# Patient Record
Sex: MALE | Race: WHITE | NOT HISPANIC OR LATINO | Employment: FULL TIME | URBAN - METROPOLITAN AREA
[De-identification: names, ages, dates, MRNs, and addresses within clinical notes are randomized per-mention and may not be internally consistent; named-entity substitution may affect disease eponyms.]

---

## 2022-03-05 ENCOUNTER — APPOINTMENT (OUTPATIENT)
Dept: GENERAL RADIOLOGY | Facility: HOSPITAL | Age: 42
End: 2022-03-05

## 2022-03-05 ENCOUNTER — HOSPITAL ENCOUNTER (EMERGENCY)
Facility: HOSPITAL | Age: 42
Discharge: HOME | End: 2022-03-05
Attending: EMERGENCY MEDICINE | Admitting: EMERGENCY MEDICINE

## 2022-03-05 VITALS
HEART RATE: 76 BPM | DIASTOLIC BLOOD PRESSURE: 84 MMHG | OXYGEN SATURATION: 99 % | RESPIRATION RATE: 16 BRPM | WEIGHT: 185 LBS | SYSTOLIC BLOOD PRESSURE: 123 MMHG | BODY MASS INDEX: 25.06 KG/M2 | HEIGHT: 72 IN | TEMPERATURE: 98 F

## 2022-03-05 DIAGNOSIS — S61.213A LACERATION OF LEFT MIDDLE FINGER WITHOUT FOREIGN BODY WITHOUT DAMAGE TO NAIL, INITIAL ENCOUNTER: Primary | ICD-10-CM

## 2022-03-05 PROCEDURE — 73140 X-RAY EXAM OF FINGER(S): CPT

## 2022-03-05 PROCEDURE — 99282 EMERGENCY DEPT VISIT SF MDM: CPT | Performed by: PHYSICIAN ASSISTANT

## 2022-03-05 PROCEDURE — 12001 RPR S/N/AX/GEN/TRNK 2.5CM/<: CPT | Performed by: PHYSICIAN ASSISTANT

## 2022-03-05 PROCEDURE — 99283 EMERGENCY DEPT VISIT LOW MDM: CPT

## 2022-03-05 PROCEDURE — 90471 IMMUNIZATION ADMIN: CPT | Performed by: PHYSICIAN ASSISTANT

## 2022-03-05 PROCEDURE — 99282 EMERGENCY DEPT VISIT SF MDM: CPT

## 2022-03-05 PROCEDURE — 90715 TDAP VACCINE 7 YRS/> IM: CPT | Performed by: PHYSICIAN ASSISTANT

## 2022-03-05 PROCEDURE — 25010000002 TETANUS-DIPHTH-ACELL PERTUSSIS 5-2.5-18.5 LF-MCG/0.5 SUSPENSION PREFILLED SYRINGE: Performed by: PHYSICIAN ASSISTANT

## 2022-03-05 RX ADMIN — TETANUS TOXOID, REDUCED DIPHTHERIA TOXOID AND ACELLULAR PERTUSSIS VACCINE, ADSORBED 0.5 ML: 5; 2.5; 8; 8; 2.5 SUSPENSION INTRAMUSCULAR at 11:37

## 2022-03-05 NOTE — EXTERNAL PATIENT INSTRUCTIONS
Patient Education   Table of Contents       Nonsutured Laceration Care     To view videos and all your education online visit,   [email or web address]   or scan this QR code with your smartphone.                  Nonsutured Laceration Care     A laceration is a cut that may go through all layers of the skin and extend into the tissue that is right under the skin. This type of cut is usually stitched up (sutured) or closed with tape (adhesive strips) or skin glue shortly after the injury happens.   However, if the wound is dirty or if several hours pass before medical treatment is provided, it is likely that germs (bacteria) will enter the wound. Closing a laceration after bacteria have entered it increases the risk of infection. In these cases, your health care provider may leave the laceration open (nonsutured) and cover it with a bandage. This type of treatment helps prevent infection and allows the wound to heal from the deepest layer of tissue damage up to the surface.   An open fracture is a type of injury that may involve nonsutured lacerations. An open fracture is a break in a bone that happens along with lacerations through the skin at the fracture site.   What are the risks?    Caring for a nonsutured laceration is safe. However, problems may occur, including a higher risk for:       Scarring.       Infection.       Slow healing.     Supplies needed:         Soap.       Hand .       Sterile water or irrigation solution.       Bandages (dressings).       Clean towel.       Antibiotic ointment.     How to care for your nonsutured laceration       Follow instructions from your health care provider about how to take care of your wound.       Keep the wound clean and dry.       Change any dressings as told by your health care provider. This includes changing the dressing when it starts to smell, or when it gets wet or dirty.       Clean the wound one time each day, or as often as told by your  health care provider. To clean your wound:     Wash your hands with soap and water. If soap and water are not available, use hand .       Remove any dressing as told by your health care provider.       Clean the wound with sterile water or irrigation solution as told by your health care provider.       Pat the wound dry with a clean towel. Do not rub the wound.       Apply a thin layer of antibiotic ointment to the wound as told by your health care provider. This will prevent infection and keep the dressing from sticking to the wound.       Apply a new dressing as told by your health care provider.        Check your wound every day for signs of infection. Watch for:       Redness, swelling, or pain.       Fluid, blood, or pus.       Bad smell on the wound or dressing.       Warmth.      Do not  take baths, swim, or do anything that puts your wound underwater until your health care provider approves.      Do not  scratch or pick at the wound.       Follow these instructions at home:         Take or apply over-the-counter and prescription medicines only as told by your health care provider.       If you were prescribed an antibiotic medicine, take or apply it as told by your health care provider. Do not  stop using the antibiotic even if your condition improves.      Do not  inject anything into the wound unless directed by your health care provider.       Raise (elevate) the injured area above the level of your heart while you are sitting or lying down, if possible.      If directed, put ice on the affected area:       Put ice in a plastic bag.       Place a towel between your skin and the bag.       Leave the ice on for 20 minutes, 2?3 times a day.       Keep all follow-up visits as told by your health care provider. This is important.     Contact a health care provider if:         You received a tetanus shot and you have swelling, severe pain, redness, or bleeding at the injection site.       You have a  fever.       Your pain is not controlled with medicine.       You have increased redness, swelling, or pain at the site of your wound.       You have fluid, blood, or pus coming from your wound.       You notice a bad smell coming from your wound or your dressing.       You notice something coming out of the wound, such as wood or glass.       You notice a change in the color of your skin near your wound.       You develop a new rash.       You need to change the dressing frequently due to fluid, blood, or pus draining from the wound.       You develop numbness around your wound.     Get help right away if:         Your pain suddenly increases and is severe.       You develop severe swelling around the wound.       The wound is on your hand or foot and you cannot properly move a finger or toe.       The wound is on your hand or foot, and you notice that your fingers or toes look pale or bluish.       You have a red streak going away from your wound.     Summary         A laceration is a cut that may go through all layers of the skin and extend into the tissue that is right under the skin. It is usually closed with stitches, tape, or skin glue shortly after the injury happens.       If a wound is dirty or if several hours pass before medical treatment is provided, the laceration may be kept open (nonsutured) and covered with a bandage.       This type of treatment helps prevent infection and allows the wound to heal from the deepest layer of tissue damage up to the surface.       Follow instructions from your health care provider about how to take care of your wound.     This information is not intended to replace advice given to you by your health care provider. Make sure you discuss any questions you have with your health care provider.     Document Released: 11/15/2007Document Revised: 04/10/2020Document Reviewed: 01/07/2019     [employer] Patient Education ? 2021 [employer]

## 2022-03-05 NOTE — EXTERNAL PATIENT INSTRUCTIONS
Patient Education   Table of Contents       Nonsutured Laceration Care     To view videos and all your education online visit,   [email or web address]   or scan this QR code with your smartphone.                  Nonsutured Laceration Care     A laceration is a cut that may go through all layers of the skin and extend into the tissue that is right under the skin. This type of cut is usually stitched up (sutured) or closed with tape (adhesive strips) or skin glue shortly after the injury happens.   However, if the wound is dirty or if several hours pass before medical treatment is provided, it is likely that germs (bacteria) will enter the wound. Closing a laceration after bacteria have entered it increases the risk of infection. In these cases, your health care provider may leave the laceration open (nonsutured) and cover it with a bandage. This type of treatment helps prevent infection and allows the wound to heal from the deepest layer of tissue damage up to the surface.   An open fracture is a type of injury that may involve nonsutured lacerations. An open fracture is a break in a bone that happens along with lacerations through the skin at the fracture site.   What are the risks?    Caring for a nonsutured laceration is safe. However, problems may occur, including a higher risk for:       Scarring.       Infection.       Slow healing.     Supplies needed:         Soap.       Hand .       Sterile water or irrigation solution.       Bandages (dressings).       Clean towel.       Antibiotic ointment.     How to care for your nonsutured laceration       Follow instructions from your health care provider about how to take care of your wound.       Keep the wound clean and dry.       Change any dressings as told by your health care provider. This includes changing the dressing when it starts to smell, or when it gets wet or dirty.       Clean the wound one time each day, or as often as told by your  health care provider. To clean your wound:     Wash your hands with soap and water. If soap and water are not available, use hand .       Remove any dressing as told by your health care provider.       Clean the wound with sterile water or irrigation solution as told by your health care provider.       Pat the wound dry with a clean towel. Do not rub the wound.       Apply a thin layer of antibiotic ointment to the wound as told by your health care provider. This will prevent infection and keep the dressing from sticking to the wound.       Apply a new dressing as told by your health care provider.        Check your wound every day for signs of infection. Watch for:       Redness, swelling, or pain.       Fluid, blood, or pus.       Bad smell on the wound or dressing.       Warmth.      Do not  take baths, swim, or do anything that puts your wound underwater until your health care provider approves.      Do not  scratch or pick at the wound.       Follow these instructions at home:         Take or apply over-the-counter and prescription medicines only as told by your health care provider.       If you were prescribed an antibiotic medicine, take or apply it as told by your health care provider. Do not  stop using the antibiotic even if your condition improves.      Do not  inject anything into the wound unless directed by your health care provider.       Raise (elevate) the injured area above the level of your heart while you are sitting or lying down, if possible.      If directed, put ice on the affected area:       Put ice in a plastic bag.       Place a towel between your skin and the bag.       Leave the ice on for 20 minutes, 2?3 times a day.       Keep all follow-up visits as told by your health care provider. This is important.     Contact a health care provider if:         You received a tetanus shot and you have swelling, severe pain, redness, or bleeding at the injection site.       You have a  fever.       Your pain is not controlled with medicine.       You have increased redness, swelling, or pain at the site of your wound.       You have fluid, blood, or pus coming from your wound.       You notice a bad smell coming from your wound or your dressing.       You notice something coming out of the wound, such as wood or glass.       You notice a change in the color of your skin near your wound.       You develop a new rash.       You need to change the dressing frequently due to fluid, blood, or pus draining from the wound.       You develop numbness around your wound.     Get help right away if:         Your pain suddenly increases and is severe.       You develop severe swelling around the wound.       The wound is on your hand or foot and you cannot properly move a finger or toe.       The wound is on your hand or foot, and you notice that your fingers or toes look pale or bluish.       You have a red streak going away from your wound.     Summary         A laceration is a cut that may go through all layers of the skin and extend into the tissue that is right under the skin. It is usually closed with stitches, tape, or skin glue shortly after the injury happens.       If a wound is dirty or if several hours pass before medical treatment is provided, the laceration may be kept open (nonsutured) and covered with a bandage.       This type of treatment helps prevent infection and allows the wound to heal from the deepest layer of tissue damage up to the surface.       Follow instructions from your health care provider about how to take care of your wound.     This information is not intended to replace advice given to you by your health care provider. Make sure you discuss any questions you have with your health care provider.     Document Released: 11/15/2007Document Revised: 04/10/2020Document Reviewed: 01/07/2019     [employer] Patient Education ? 2021 [employer]

## 2022-03-05 NOTE — EXTERNAL PATIENT INSTRUCTIONS
Patient Education   Table of Contents       Nonsutured Laceration Care     To view videos and all your education online visit,   [email or web address]   or scan this QR code with your smartphone.                  Nonsutured Laceration Care     A laceration is a cut that may go through all layers of the skin and extend into the tissue that is right under the skin. This type of cut is usually stitched up (sutured) or closed with tape (adhesive strips) or skin glue shortly after the injury happens.   However, if the wound is dirty or if several hours pass before medical treatment is provided, it is likely that germs (bacteria) will enter the wound. Closing a laceration after bacteria have entered it increases the risk of infection. In these cases, your health care provider may leave the laceration open (nonsutured) and cover it with a bandage. This type of treatment helps prevent infection and allows the wound to heal from the deepest layer of tissue damage up to the surface.   An open fracture is a type of injury that may involve nonsutured lacerations. An open fracture is a break in a bone that happens along with lacerations through the skin at the fracture site.   What are the risks?    Caring for a nonsutured laceration is safe. However, problems may occur, including a higher risk for:       Scarring.       Infection.       Slow healing.     Supplies needed:         Soap.       Hand .       Sterile water or irrigation solution.       Bandages (dressings).       Clean towel.       Antibiotic ointment.     How to care for your nonsutured laceration       Follow instructions from your health care provider about how to take care of your wound.       Keep the wound clean and dry.       Change any dressings as told by your health care provider. This includes changing the dressing when it starts to smell, or when it gets wet or dirty.       Clean the wound one time each day, or as often as told by your  health care provider. To clean your wound:     Wash your hands with soap and water. If soap and water are not available, use hand .       Remove any dressing as told by your health care provider.       Clean the wound with sterile water or irrigation solution as told by your health care provider.       Pat the wound dry with a clean towel. Do not rub the wound.       Apply a thin layer of antibiotic ointment to the wound as told by your health care provider. This will prevent infection and keep the dressing from sticking to the wound.       Apply a new dressing as told by your health care provider.        Check your wound every day for signs of infection. Watch for:       Redness, swelling, or pain.       Fluid, blood, or pus.       Bad smell on the wound or dressing.       Warmth.      Do not  take baths, swim, or do anything that puts your wound underwater until your health care provider approves.      Do not  scratch or pick at the wound.       Follow these instructions at home:         Take or apply over-the-counter and prescription medicines only as told by your health care provider.       If you were prescribed an antibiotic medicine, take or apply it as told by your health care provider. Do not  stop using the antibiotic even if your condition improves.      Do not  inject anything into the wound unless directed by your health care provider.       Raise (elevate) the injured area above the level of your heart while you are sitting or lying down, if possible.      If directed, put ice on the affected area:       Put ice in a plastic bag.       Place a towel between your skin and the bag.       Leave the ice on for 20 minutes, 2?3 times a day.       Keep all follow-up visits as told by your health care provider. This is important.     Contact a health care provider if:         You received a tetanus shot and you have swelling, severe pain, redness, or bleeding at the injection site.       You have a  fever.       Your pain is not controlled with medicine.       You have increased redness, swelling, or pain at the site of your wound.       You have fluid, blood, or pus coming from your wound.       You notice a bad smell coming from your wound or your dressing.       You notice something coming out of the wound, such as wood or glass.       You notice a change in the color of your skin near your wound.       You develop a new rash.       You need to change the dressing frequently due to fluid, blood, or pus draining from the wound.       You develop numbness around your wound.     Get help right away if:         Your pain suddenly increases and is severe.       You develop severe swelling around the wound.       The wound is on your hand or foot and you cannot properly move a finger or toe.       The wound is on your hand or foot, and you notice that your fingers or toes look pale or bluish.       You have a red streak going away from your wound.     Summary         A laceration is a cut that may go through all layers of the skin and extend into the tissue that is right under the skin. It is usually closed with stitches, tape, or skin glue shortly after the injury happens.       If a wound is dirty or if several hours pass before medical treatment is provided, the laceration may be kept open (nonsutured) and covered with a bandage.       This type of treatment helps prevent infection and allows the wound to heal from the deepest layer of tissue damage up to the surface.       Follow instructions from your health care provider about how to take care of your wound.     This information is not intended to replace advice given to you by your health care provider. Make sure you discuss any questions you have with your health care provider.     Document Released: 11/15/2007Document Revised: 04/10/2020Document Reviewed: 01/07/2019     [employer] Patient Education ? 2021 [employer]

## 2022-03-05 NOTE — ED PROVIDER NOTES
EMERGENCY DEPARTMENT ENCOUNTER      Room Number: 14/14    History is provided by the patient, no translation services needed    HPI:    Chief complaint: finger laceration    Narrative: Pt is a 41 y.o. male who presents complaining of laceration to hand.  Patient was working with a drill today when his glove got caught and he had a crush wound to the middle finger on his left hand causing a laceration.  He reports he feels some numbness in the finger and therefore he wanted to be evaluated as he was worried about a potential fracture or nerve damage.  Reports he thinks his tetanus was updated recently but is not certain.  No other injuries.  He is not any blood thinners.  No other complaints.      PMD: Provider, No Known    REVIEW OF SYSTEMS  Review of Systems  Complete review of systems performed otherwise negative except pertinent positives per HPI.    PAST MEDICAL HISTORY  Active Ambulatory Problems     Diagnosis Date Noted   • No Active Ambulatory Problems     Resolved Ambulatory Problems     Diagnosis Date Noted   • No Resolved Ambulatory Problems     Past Medical History:   Diagnosis Date   • Hyperlipidemia    • Hypertension        PAST SURGICAL HISTORY  No past surgical history on file.    FAMILY HISTORY  No family history on file.    SOCIAL HISTORY  Social History     Socioeconomic History   • Marital status: Other   Substance and Sexual Activity   • Alcohol use: Never   • Drug use: Never   • Sexual activity: Never       ALLERGIES  Patient has no known allergies.    No current facility-administered medications for this encounter.    Current Outpatient Medications:   •  NON FORMULARY, 1 tablet Daily. For htn, Disp: , Rfl:   •  NON FORMULARY, 1 tablet Daily. For choles, Disp: , Rfl:     PHYSICAL EXAM  ED Triage Vitals [03/05/22 1054]   Temp Heart Rate Resp BP SpO2   98 °F (36.7 °C) 76 16 123/84 99 %      Temp src Heart Rate Source Patient Position BP Location FiO2 (%)   -- -- Sitting Right arm --        Physical Exam  Vitals and nursing note reviewed.   Constitutional:       Appearance: Normal appearance. He is normal weight. He is not ill-appearing or toxic-appearing.   HENT:      Head: Normocephalic and atraumatic.      Nose: Nose normal.      Mouth/Throat:      Mouth: Mucous membranes are moist.   Eyes:      Extraocular Movements: Extraocular movements intact.      Conjunctiva/sclera: Conjunctivae normal.      Pupils: Pupils are equal, round, and reactive to light.   Cardiovascular:      Rate and Rhythm: Normal rate and regular rhythm.   Pulmonary:      Effort: Pulmonary effort is normal.   Abdominal:      General: Abdomen is flat.      Palpations: Abdomen is soft.   Musculoskeletal:         General: Normal range of motion.      Cervical back: Normal range of motion.   Skin:     General: Skin is warm.      Capillary Refill: Capillary refill takes less than 2 seconds. Normal capillary refill of injured digit.  Radial pulses strong.     Coloration: Skin is not pale.      Comments: Superficial laceration to the middle digit on the left hand on the pad of the proximal phalanx.  Cannot visualize tendon, blood vessels or any structures underneath.  Wound is not gaping.   Neurological:      General: No focal deficit present.      Mental Status: He is alert.   Psychiatric:         Mood and Affect: Mood normal.           LAB RESULTS  Lab Results (last 24 hours)     ** No results found for the last 24 hours. **            I ordered the above labs and reviewed the results    RADIOLOGY  No Radiology Exams Resulted Within Past 24 Hours    I ordered the above radiologic testing and reviewed the results    PROCEDURES  Laceration Repair    Date/Time: 3/12/2022 9:10 PM  Performed by: Leyda Pulido PA-C  Authorized by: Xu Hunter MD     Consent:     Consent obtained:  Verbal    Consent given by:  Patient    Risks discussed:  Infection and poor cosmetic result  Anesthesia:     Anesthesia method:   None  Laceration details:     Location:  Finger    Finger location:  L long finger    Length (cm):  1    Depth (mm):  2  Exploration:     Imaging obtained: x-ray      Imaging outcome: foreign body not noted      Wound exploration: wound explored through full range of motion      Contaminated: no    Treatment:     Area cleansed with:  Saline  Skin repair:     Repair method:  Tissue adhesive  Approximation:     Approximation:  Close  Post-procedure details:     Dressing:  Open (no dressing)    Procedure completion:  Tolerated well, no immediate complications          PROGRESS AND CONSULTS  ED Course as of 03/12/22 2111   Sat Mar 05, 2022   1123 XR finger: no acute fracture  [KM]      ED Course User Index  [KM] Leyda Pulido, CHARLEE           MEDICAL DECISION MAKING    MDM     41-year-old male seen and evaluated for laceration to the third digit on the left hand.  This is quite superficial.  X-ray obtained of the finger and does not show any underlying fractures.  He does feel some numbness in the finger although I think with how superficial the laceration is and it is very centrally located it is highly unlikely patient damaged a nerve and I think this is likely just from the impact causing him to have some reduced sensation at this time but will likely improve.  Range of motion is normal.  He does have normal capillary refill.  Wound was explored through full range of motion and saline was used to clean the wound and as it was not very deep patient wishes to have skin glue applied as opposed to sutures and I think this is reasonable.  His tetanus was updated today.  He was sent home in stable condition with appropriate return precautions.    DIAGNOSIS  Final diagnoses:   Laceration of left middle finger without foreign body without damage to nail, initial encounter       Latest Documented Vital Signs:  As of 21:11 EST  BP- 123/84 HR- 76 Temp- 98 °F (36.7 °C) O2 sat- 99%    DISPOSITION    Discussed pertinent  findings with the patient/family.  Patient/Family voiced understanding of need to follow-up for recheck and further testing as needed.  Return to the Emergency Department warnings were given.         Medication List      No changes were made to your prescriptions during this visit.              Follow-up Information     Call  Provider, No Known.    Why: To schedule follow up appointment  Contact information:  [facility] [id number] [phone]                           Dictated utilizing Dragon dictation     Leyda Pulido PA-C  03/05/22 1141       Leyda Pulido PA-C  03/12/22 9220

## 2024-11-08 ENCOUNTER — TRANSCRIBE ORDERS (OUTPATIENT)
Age: 44
End: 2024-11-08
Payer: COMMERCIAL

## 2024-11-08 DIAGNOSIS — R20.2 PARESTHESIA OF SKIN: Primary | ICD-10-CM

## 2024-11-15 ENCOUNTER — OFFICE VISIT (OUTPATIENT)
Dept: CARDIOLOGY | Facility: CLINIC | Age: 44
End: 2024-11-15
Payer: COMMERCIAL

## 2024-11-15 VITALS
HEIGHT: 60 IN | OXYGEN SATURATION: 96 % | WEIGHT: 191 LBS | RESPIRATION RATE: 16 BRPM | BODY MASS INDEX: 37.5 KG/M2 | DIASTOLIC BLOOD PRESSURE: 80 MMHG | SYSTOLIC BLOOD PRESSURE: 120 MMHG | HEART RATE: 75 BPM

## 2024-11-15 DIAGNOSIS — R94.31 ABNORMAL EKG: ICD-10-CM

## 2024-11-15 DIAGNOSIS — R07.2 PRECORDIAL PAIN: Primary | ICD-10-CM

## 2024-11-15 PROCEDURE — 99204 OFFICE O/P NEW MOD 45 MIN: CPT | Performed by: INTERNAL MEDICINE

## 2024-11-15 RX ORDER — METOPROLOL TARTRATE 50 MG
TABLET ORAL
Qty: 2 TABLET | Refills: 0 | Status: SHIPPED | OUTPATIENT
Start: 2024-11-15

## 2024-11-15 RX ORDER — METOPROLOL TARTRATE 25 MG/1
25 TABLET, FILM COATED ORAL ONCE
OUTPATIENT
Start: 2024-11-22

## 2024-11-15 RX ORDER — METOPROLOL TARTRATE 25 MG/1
150 TABLET, FILM COATED ORAL ONCE
OUTPATIENT
Start: 2024-11-22

## 2024-11-15 RX ORDER — METOPROLOL TARTRATE 25 MG/1
50 TABLET, FILM COATED ORAL ONCE
OUTPATIENT
Start: 2024-11-22

## 2024-11-15 RX ORDER — SODIUM CHLORIDE 0.9 % (FLUSH) 0.9 %
10 SYRINGE (ML) INJECTION EVERY 12 HOURS SCHEDULED
OUTPATIENT
Start: 2024-11-22

## 2024-11-15 RX ORDER — SODIUM CHLORIDE 0.9 % (FLUSH) 0.9 %
10 SYRINGE (ML) INJECTION AS NEEDED
OUTPATIENT
Start: 2024-11-22

## 2024-11-15 RX ORDER — IVABRADINE 5 MG/1
15 TABLET, FILM COATED ORAL ONCE
OUTPATIENT
Start: 2024-11-22 | End: 2024-11-15

## 2024-11-15 RX ORDER — METOPROLOL TARTRATE 25 MG/1
100 TABLET, FILM COATED ORAL ONCE
OUTPATIENT
Start: 2024-11-22

## 2024-11-15 RX ORDER — NITROGLYCERIN 0.4 MG/1
0.4 TABLET SUBLINGUAL
OUTPATIENT
Start: 2024-11-22 | End: 2024-11-15

## 2024-11-15 RX ORDER — NITROGLYCERIN 0.4 MG/1
0.8 TABLET SUBLINGUAL
OUTPATIENT
Start: 2024-11-22

## 2024-11-15 RX ORDER — METOPROLOL TARTRATE 1 MG/ML
5 INJECTION, SOLUTION INTRAVENOUS
OUTPATIENT
Start: 2024-11-22

## 2024-11-15 RX ORDER — SODIUM CHLORIDE 9 MG/ML
40 INJECTION, SOLUTION INTRAVENOUS AS NEEDED
OUTPATIENT
Start: 2024-11-22

## 2024-11-15 RX ORDER — METOPROLOL TARTRATE 25 MG/1
200 TABLET, FILM COATED ORAL ONCE
OUTPATIENT
Start: 2024-11-22 | End: 2024-11-15

## 2024-11-15 RX ORDER — METOPROLOL TARTRATE 50 MG
50 TABLET ORAL
OUTPATIENT
Start: 2024-11-22

## 2024-11-18 NOTE — PROGRESS NOTES
[facility]      Patient Name: Jeramy Wilson  :1980  Age: 44 y.o.  Encounter Provider:  Soto Irene Jr, MD      Chief Complaint: Initial evaluation of chest discomfort        HPI  Jeramy Wilson is a 44 y.o. male past medical history hypertension and mixed hyperlipidemia who presents for evaluation of chest discomfort.  Patient has had random episodes of atypical sounding chest discomfort with bilateral arm paresthesias.  Arm paresthesias persisted after chest pain resolved but a few days later the chest pain came back and persisted for few days without respite.  Once resolved the patient has not experienced symptoms for the last 2 weeks.  Patient states that the pain had variable characteristic with some pressure and some short sharp pain which was consistent with a similar episode about 6 to 7 years ago.  He saw [facility] at that time and had some testing but records are not available for review.  Patient states that he exercises daily for about 30 minutes and lifts weights 3 times per week.  He is a former smoker who quit 18 years ago, drinks socially and denies illicit drug use.  No family history of coronary artery disease.  EKG today shows sinus rhythm with T wave inversion in the inferolateral leads.      The following portions of the patient's history were reviewed and updated as appropriate: allergies, current medications, past family history, past medical history, past social history, past surgical history and problem list.      Review of Systems   Constitutional: Negative for chills and fever.   HENT:  Negative for hoarse voice and sore throat.    Eyes:  Negative for double vision and photophobia.   Cardiovascular:  Positive for chest pain. Negative for leg swelling, near-syncope, orthopnea, palpitations, paroxysmal nocturnal dyspnea and syncope.   Respiratory:  Negative for cough and wheezing.    Skin:  Negative for poor wound healing and rash.  "  Musculoskeletal:  Negative for arthritis and joint swelling.   Gastrointestinal:  Negative for bloating, abdominal pain, hematemesis and hematochezia.   Neurological:  Positive for paresthesias. Negative for dizziness and focal weakness.   Psychiatric/Behavioral:  Negative for depression and suicidal ideas.      OBJECTIVE:   Vital Signs  Vitals:    11/15/24 1300   BP: 120/80   Pulse: 75   Resp: 16   SpO2: 96%     Estimated body mass index is 167.13 kg/m² as calculated from the following:    Height as of this encounter: 72 cm (28.35\").    Weight as of this encounter: 86.6 kg (191 lb).    Vitals reviewed.   Constitutional:       Appearance: Healthy appearance. Not in distress.   Neck:      Vascular: No JVR. JVD normal.   Pulmonary:      Effort: Pulmonary effort is normal.      Breath sounds: Normal breath sounds. No wheezing. No rhonchi. No rales.   Chest:      Chest wall: Not tender to palpatation.   Cardiovascular:      PMI at left midclavicular line. Normal rate. Regular rhythm. Normal S1. Normal S2.       Murmurs: There is no murmur.      No gallop.  No click. No rub.   Pulses:     Intact distal pulses.   Edema:     Peripheral edema absent.   Abdominal:      General: Bowel sounds are normal.      Palpations: Abdomen is soft.      Tenderness: There is no abdominal tenderness.   Musculoskeletal: Normal range of motion.         General: No tenderness. Skin:     General: Skin is warm and dry.   Neurological:      General: No focal deficit present.      Mental Status: Alert and oriented to person, place and time.       ECG 12 Lead    Date/Time: 11/19/2024 8:35 AM  Performed by: Soto Irene Jr., MD    Authorized by: ProviderYennifer MD  Comparison: not compared with previous ECG   Previous ECG: no previous ECG available  Rhythm: sinus rhythm  T inversion: II, III, aVF, V5 and V6    Clinical impression: myocardial ischemia           BUN   Date Value Ref Range Status   02/23/2020 18 7 - 20 mg/dL Final "     Creatinine   Date Value Ref Range Status   02/23/2020 0.8 0.7 - 1.5 mg/dL Final     Potassium   Date Value Ref Range Status   02/23/2020 4.3 3.5 - 5.1 mmol/L Final           ASSESSMENT:      Diagnosis Plan   1. Precordial pain  CT Angiogram Coronary    CT Angiogram Coronary-Cardiology Interpretation    metoprolol tartrate (LOPRESSOR) tablet 200 mg    metoprolol tartrate (LOPRESSOR) tablet 150 mg    metoprolol tartrate (LOPRESSOR) tablet 100 mg    metoprolol tartrate (LOPRESSOR) tablet 50 mg    metoprolol tartrate (LOPRESSOR) tablet 25 mg    metoprolol tartrate (LOPRESSOR) tablet 50 mg    metoprolol tartrate (LOPRESSOR) injection 5 mg    nitroglycerin (NITROSTAT) SL tablet 0.4 mg    nitroglycerin (NITROSTAT) SL tablet 0.8 mg    ivabradine HCl (CORLANOR) tablet 15 mg    No Caffeine or Nicotine 4 Hours Prior to CTA Appointment    Nothing to Eat or Drink 4 Hours Prior to CTA Appointment    Do Not Take Phosphodiasterase Inhibitors in the 72 Hours Prior to Coronary CTA    Obtain Informed Consent - Computed Tomography Angiography of Chest - Angiogram of Coronary Arteries    Vital Signs Upon Arrival    Cardiac Monitoring    Verify NPO Status - Patient to Be NPO at Least 4 Hours Prior to CTA    Notify CT After Administration of metoprolol tartrate (LOPRESSOR) tablet    Notify Provider If Total Metoprolol Given Equals 300mg & Heart Rate Not At Goal    Notify Provider Prior to Administration of Nitroglycerin if Patient SBP <80    POC Creatinine    Insert Peripheral IV    Saline Lock & Maintain IV Access    sodium chloride 0.9 % flush 10 mL    sodium chloride 0.9 % flush 10 mL    sodium chloride 0.9 % infusion 40 mL    Vital Signs - See Instructions    Hold Medication Metformin (Glucophage, Glucophage XR, Fortament, Glumetza);  Metglip (metformin/glipizide);  Glucovance (metformin/glyburide); Avandamet (metformin/rosiglitazone)    Patient May Discharge Home After Procedure Complete (If Stable)    metoprolol tartrate  (LOPRESSOR) 50 MG tablet    Adult Transthoracic Echo Complete w/ Color, Spectral and Contrast if Necessary Per Protocol      2. Abnormal EKG  CT Angiogram Coronary    CT Angiogram Coronary-Cardiology Interpretation    metoprolol tartrate (LOPRESSOR) tablet 200 mg    metoprolol tartrate (LOPRESSOR) tablet 150 mg    metoprolol tartrate (LOPRESSOR) tablet 100 mg    metoprolol tartrate (LOPRESSOR) tablet 50 mg    metoprolol tartrate (LOPRESSOR) tablet 25 mg    metoprolol tartrate (LOPRESSOR) tablet 50 mg    metoprolol tartrate (LOPRESSOR) injection 5 mg    nitroglycerin (NITROSTAT) SL tablet 0.4 mg    nitroglycerin (NITROSTAT) SL tablet 0.8 mg    ivabradine HCl (CORLANOR) tablet 15 mg    No Caffeine or Nicotine 4 Hours Prior to CTA Appointment    Nothing to Eat or Drink 4 Hours Prior to CTA Appointment    Do Not Take Phosphodiasterase Inhibitors in the 72 Hours Prior to Coronary CTA    Obtain Informed Consent - Computed Tomography Angiography of Chest - Angiogram of Coronary Arteries    Vital Signs Upon Arrival    Cardiac Monitoring    Verify NPO Status - Patient to Be NPO at Least 4 Hours Prior to CTA    Notify CT After Administration of metoprolol tartrate (LOPRESSOR) tablet    Notify Provider If Total Metoprolol Given Equals 300mg & Heart Rate Not At Goal    Notify Provider Prior to Administration of Nitroglycerin if Patient SBP <80    POC Creatinine    Insert Peripheral IV    Saline Lock & Maintain IV Access    sodium chloride 0.9 % flush 10 mL    sodium chloride 0.9 % flush 10 mL    sodium chloride 0.9 % infusion 40 mL    Vital Signs - See Instructions    Hold Medication Metformin (Glucophage, Glucophage XR, Fortament, Glumetza);  Metglip (metformin/glipizide);  Glucovance (metformin/glyburide); Avandamet (metformin/rosiglitazone)    Patient May Discharge Home After Procedure Complete (If Stable)    metoprolol tartrate (LOPRESSOR) 50 MG tablet    Adult Transthoracic Echo Complete w/ Color, Spectral and Contrast if  Necessary Per Protocol            PLAN OF CARE:     Precordial pain -typical and atypical characteristics in gentleman with abnormal EKG.  Stress study 6 years ago without evidence of ischemia and given the recurrent nature of the chest pain and abnormal EKG we will need to move forward with testing.  Plan for CT coronary angiogram.  Follow diagnostic testing results for further treatment recommendations.  Essential hypertension -seemingly well-controlled this time.  Twice daily blood pressure log to be sent in after 1 week.  Mixed hyperlipidemia    Return to clinic 6 months             Discharge Medications            Accurate as of November 15, 2024 11:59 PM. If you have any questions, ask your nurse or doctor.                New Medications        Instructions Start Date   metoprolol tartrate 50 MG tablet  Commonly known as: LOPRESSOR  Started by: Soto Irene   Take 50 mg at Bedtime the Night Before Coronary CTA Appointment and In the Morning 1 Hour Prior to Coronary CTA Appointment. Do not take if heart rate less than 60.             Continue These Medications        Instructions Start Date   lansoprazole 30 MG capsule  Commonly known as: PREVACID   lansoprazole 30 mg capsule,delayed release      losartan 50 MG tablet  Commonly known as: COZAAR   1 tablet, Oral, Daily      pravastatin 10 MG tablet  Commonly known as: PRAVACHOL   pravastatin 10 mg tablet      ZyrTEC Allergy 10 MG tablet  Generic drug: cetirizine   1 tablet, Daily               Thank you for allowing me to participate in the care of your patient,      Sincerely,   Soto Irene Jr, MD  [facility]  11/19/24  08:32 EST

## 2024-11-19 PROCEDURE — 93000 ELECTROCARDIOGRAM COMPLETE: CPT | Performed by: INTERNAL MEDICINE

## 2024-11-22 ENCOUNTER — HOSPITAL ENCOUNTER (OUTPATIENT)
Dept: CARDIOLOGY | Facility: HOSPITAL | Age: 44
Discharge: HOME | End: 2024-11-22
Admitting: INTERNAL MEDICINE
Payer: COMMERCIAL

## 2024-11-22 ENCOUNTER — TELEPHONE (OUTPATIENT)
Dept: CARDIOLOGY | Facility: CLINIC | Age: 44
End: 2024-11-22
Payer: COMMERCIAL

## 2024-11-22 DIAGNOSIS — R07.2 PRECORDIAL PAIN: ICD-10-CM

## 2024-11-22 DIAGNOSIS — R94.31 ABNORMAL EKG: ICD-10-CM

## 2024-11-22 LAB
AORTIC DIMENSIONLESS INDEX: 0.61 (DI)
ASCENDING AORTA: 2.9 CM
BH CV ECHO MEAS - ACS: 2 CM
BH CV ECHO MEAS - AO MAX PG: 6.8 MMHG
BH CV ECHO MEAS - AO MEAN PG: 4 MMHG
BH CV ECHO MEAS - AO ROOT AREA (BSA CORRECTED): 1.5 CM2
BH CV ECHO MEAS - AO ROOT DIAM: 3.1 CM
BH CV ECHO MEAS - AO V2 MAX: 130 CM/SEC
BH CV ECHO MEAS - AO V2 VTI: 23.9 CM
BH CV ECHO MEAS - AVA(I,D): 1.85 CM2
BH CV ECHO MEAS - EDV(CUBED): 103.8 ML
BH CV ECHO MEAS - EDV(MOD-SP2): 74 ML
BH CV ECHO MEAS - EDV(MOD-SP4): 92 ML
BH CV ECHO MEAS - EF(MOD-BP): 60.2 %
BH CV ECHO MEAS - EF(MOD-SP2): 58.1 %
BH CV ECHO MEAS - EF(MOD-SP4): 63 %
BH CV ECHO MEAS - ESV(MOD-SP2): 31 ML
BH CV ECHO MEAS - ESV(MOD-SP4): 34 ML
BH CV ECHO MEAS - FS: 45.9 %
BH CV ECHO MEAS - IVS/LVPW: 1 CM
BH CV ECHO MEAS - IVSD: 0.8 CM
BH CV ECHO MEAS - LAT PEAK E' VEL: 15 CM/SEC
BH CV ECHO MEAS - LV DIASTOLIC VOL/BSA (35-75): 45 CM2
BH CV ECHO MEAS - LV MASS(C)D: 122.3 GRAMS
BH CV ECHO MEAS - LV MAX PG: 2.33 MMHG
BH CV ECHO MEAS - LV MEAN PG: 1 MMHG
BH CV ECHO MEAS - LV SYSTOLIC VOL/BSA (12-30): 16.6 CM2
BH CV ECHO MEAS - LV V1 MAX: 76.3 CM/SEC
BH CV ECHO MEAS - LV V1 VTI: 14.5 CM
BH CV ECHO MEAS - LVIDD: 4.7 CM
BH CV ECHO MEAS - LVIDS: 2.5 CM
BH CV ECHO MEAS - LVOT AREA: 3.1 CM2
BH CV ECHO MEAS - LVOT DIAM: 1.97 CM
BH CV ECHO MEAS - LVPWD: 0.8 CM
BH CV ECHO MEAS - MED PEAK E' VEL: 10.9 CM/SEC
BH CV ECHO MEAS - MV A DUR: 0.14 SEC
BH CV ECHO MEAS - MV A MAX VEL: 70.7 CM/SEC
BH CV ECHO MEAS - MV DEC SLOPE: 664.4 CM/SEC2
BH CV ECHO MEAS - MV DEC TIME: 0.19 SEC
BH CV ECHO MEAS - MV E MAX VEL: 67.7 CM/SEC
BH CV ECHO MEAS - MV E/A: 0.96
BH CV ECHO MEAS - MV MAX PG: 1.99 MMHG
BH CV ECHO MEAS - MV MEAN PG: 1.05 MMHG
BH CV ECHO MEAS - MV P1/2T: 30.5 MSEC
BH CV ECHO MEAS - MV V2 VTI: 19.8 CM
BH CV ECHO MEAS - MVA(P1/2T): 7.2 CM2
BH CV ECHO MEAS - MVA(VTI): 2.24 CM2
BH CV ECHO MEAS - PA V2 MAX: 130.3 CM/SEC
BH CV ECHO MEAS - QP/QS: 0.54
BH CV ECHO MEAS - RAP SYSTOLE: 3 MMHG
BH CV ECHO MEAS - RV MAX PG: 2.05 MMHG
BH CV ECHO MEAS - RV V1 MAX: 71.6 CM/SEC
BH CV ECHO MEAS - RV V1 VTI: 13.4 CM
BH CV ECHO MEAS - RVOT DIAM: 1.51 CM
BH CV ECHO MEAS - SV(LVOT): 44.3 ML
BH CV ECHO MEAS - SV(MOD-SP2): 43 ML
BH CV ECHO MEAS - SV(MOD-SP4): 58 ML
BH CV ECHO MEAS - SV(RVOT): 24 ML
BH CV ECHO MEAS - SVI(LVOT): 21.7 ML/M2
BH CV ECHO MEAS - SVI(MOD-SP2): 21.1 ML/M2
BH CV ECHO MEAS - SVI(MOD-SP4): 28.4 ML/M2
BH CV ECHO MEAS - TAPSE (>1.6): 2.6 CM
BH CV ECHO MEASUREMENTS AVERAGE E/E' RATIO: 5.23
BH CV XLRA - RV BASE: 3.4 CM
BH CV XLRA - RV LENGTH: 7 CM
BH CV XLRA - RV MID: 2.7 CM
BH CV XLRA - TDI S': 12.5 CM/SEC
LEFT ATRIUM VOLUME INDEX: 16.8 ML/M2
SINUS: 3.1 CM
STJ: 3 CM

## 2024-11-22 PROCEDURE — 93306 TTE W/DOPPLER COMPLETE: CPT

## 2024-11-22 NOTE — TELEPHONE ENCOUNTER
Notified Jeramy Wilson of results, patient verbalizes understanding.    Geri Paiz RN  [facility] T[facility]  11/22/24 13:25 EST

## 2024-11-22 NOTE — TELEPHONE ENCOUNTER
Permission for the hub to transfer this call directly to the nurse triage line at [facility].    Attempted to call Jeramy Wilson, no answer.  Left a voicemail for patient to call back and ask to speak with the triage nurses.  Will continue to try to reach patient.    Geri Paiz RN  [facility] Triage  11/22/24 12:18 EST

## 2024-11-22 NOTE — TELEPHONE ENCOUNTER
----- Message from Soto Irene sent at 11/22/2024 12:14 PM EST -----  Please let patient know that echocardiogram is normal.  I will call him back once the CT coronary angiogram is resulted.

## 2024-11-22 NOTE — PROGRESS NOTES
Please let patient know that echocardiogram is normal.  I will call him back once the CT coronary angiogram is resulted.

## 2025-01-08 ENCOUNTER — TELEPHONE (OUTPATIENT)
Dept: CARDIOLOGY | Facility: HOSPITAL | Age: 45
End: 2025-01-08
Payer: COMMERCIAL

## 2025-01-08 NOTE — TELEPHONE ENCOUNTER
CTA  pre-procedure call list complete. HR 80 per telemetry.Metoprolol 50mg tabs x2 prescribed ands sent to pharmacy. Patient, instructed to take 1 tablet the night before and 1 tab 1 hr before procedure.  Wear comfortable clothing.No caffeine 24 hrs prior to.No smoking 4hrs prior. NPO(except clear liquids) 4 hrs prior. Arrive 30 mins before appt. Expect @2 hr stay time.  Patient voiced understanding and had no questions.    Jess Nguyen RN  [facility]

## 2025-01-09 ENCOUNTER — HOSPITAL ENCOUNTER (OUTPATIENT)
Dept: CT IMAGING | Facility: HOSPITAL | Age: 45
Discharge: HOME | End: 2025-01-09
Payer: COMMERCIAL

## 2025-01-09 ENCOUNTER — HOSPITAL ENCOUNTER (OUTPATIENT)
Dept: CARDIOLOGY | Facility: HOSPITAL | Age: 45
Discharge: HOME | End: 2025-01-09
Payer: COMMERCIAL

## 2025-01-09 VITALS
DIASTOLIC BLOOD PRESSURE: 80 MMHG | OXYGEN SATURATION: 98 % | HEART RATE: 61 BPM | SYSTOLIC BLOOD PRESSURE: 122 MMHG | RESPIRATION RATE: 16 BRPM

## 2025-01-09 DIAGNOSIS — R94.31 ABNORMAL EKG: ICD-10-CM

## 2025-01-09 DIAGNOSIS — R07.2 PRECORDIAL PAIN: ICD-10-CM

## 2025-01-09 DIAGNOSIS — R07.2 PRECORDIAL PAIN: Primary | ICD-10-CM

## 2025-01-09 PROCEDURE — 36415 COLL VENOUS BLD VENIPUNCTURE: CPT

## 2025-01-09 PROCEDURE — 75574 CT ANGIO HRT W/3D IMAGE: CPT

## 2025-01-09 RX ORDER — NITROGLYCERIN 0.4 MG/1
0.8 TABLET SUBLINGUAL
Status: DISCONTINUED | OUTPATIENT
Start: 2025-01-09 | End: 2025-01-10 | Stop reason: HOSPADM

## 2025-01-09 RX ADMIN — NITROGLYCERIN 0.8 MG: 0.4 TABLET SUBLINGUAL at 10:21

## 2025-01-13 NOTE — PROGRESS NOTES
Please let patient know that CT shows no evidence for coronary artery disease.  There is some thickening of the airways which could be consistent with bronchitis and he should contact his PCP for evaluation.

## 2025-01-24 ENCOUNTER — HOSPITAL ENCOUNTER (OUTPATIENT)
Dept: CT IMAGING | Facility: HOSPITAL | Age: 45
End: 2025-01-24
Payer: COMMERCIAL

## 2025-02-21 ENCOUNTER — OFFICE VISIT (OUTPATIENT)
Dept: CARDIOLOGY | Facility: CLINIC | Age: 45
End: 2025-02-21
Payer: COMMERCIAL

## 2025-02-21 VITALS
WEIGHT: 200 LBS | RESPIRATION RATE: 18 BRPM | HEART RATE: 78 BPM | HEIGHT: 60 IN | OXYGEN SATURATION: 98 % | DIASTOLIC BLOOD PRESSURE: 74 MMHG | SYSTOLIC BLOOD PRESSURE: 120 MMHG | BODY MASS INDEX: 39.27 KG/M2

## 2025-02-21 DIAGNOSIS — R07.2 PRECORDIAL PAIN: Primary | ICD-10-CM

## 2025-02-21 DIAGNOSIS — R94.31 ABNORMAL EKG: ICD-10-CM

## 2025-02-21 PROCEDURE — 99213 OFFICE O/P EST LOW 20 MIN: CPT | Performed by: INTERNAL MEDICINE

## 2025-02-21 NOTE — PROGRESS NOTES
[facility]      Patient Name: Jeramy Wislon  :1980  Age: 44 y.o.  Encounter Provider:  Soto Irene Jr, MD      Chief Complaint: Initial evaluation of chest discomfort        HPI  Jeramy Wilson is a 44 y.o. male past medical history hypertension and mixed hyperlipidemia who presents for evaluation of chest discomfort.      Last clinic visit note: Patient has had random episodes of atypical sounding chest discomfort with bilateral arm paresthesias.  Arm paresthesias persisted after chest pain resolved but a few days later the chest pain came back and persisted for few days without respite.  Once resolved the patient has not experienced symptoms for the last 2 weeks.  Patient states that the pain had variable characteristic with some pressure and some short sharp pain which was consistent with a similar episode about 6 to 7 years ago.  He saw [facility] at that time and had some testing but records are not available for review.  Patient states that he exercises daily for about 30 minutes and lifts weights 3 times per week.  He is a former smoker who quit 18 years ago, drinks socially and denies illicit drug use.  No family history of coronary artery disease.  EKG today shows sinus rhythm with T wave inversion in the inferolateral leads.    CT coronary angiogram showed no evidence of coronary artery disease.  Calcium score of 0.  Some thickening of the airways consistent with bronchitis.  He has not had any recurrent symptoms and is exercising regularly.  No cardiac complaints at time of interview.  Blood pressure heart rate are well-controlled today in clinic.    The following portions of the patient's history were reviewed and updated as appropriate: allergies, current medications, past family history, past medical history, past social history, past surgical history and problem list.      Review of Systems   Constitutional: Negative for chills and fever.   HENT:   "Negative for hoarse voice and sore throat.    Eyes:  Negative for double vision and photophobia.   Cardiovascular:  Positive for chest pain. Negative for leg swelling, near-syncope, orthopnea, palpitations, paroxysmal nocturnal dyspnea and syncope.   Respiratory:  Negative for cough and wheezing.    Skin:  Negative for poor wound healing and rash.   Musculoskeletal:  Negative for arthritis and joint swelling.   Gastrointestinal:  Negative for bloating, abdominal pain, hematemesis and hematochezia.   Neurological:  Positive for paresthesias. Negative for dizziness and focal weakness.   Psychiatric/Behavioral:  Negative for depression and suicidal ideas.        OBJECTIVE:   Vital Signs  Vitals:    02/21/25 1129   BP: 120/74   Pulse: 78   Resp: 18   SpO2: 98%     Estimated body mass index is 175 kg/m² as calculated from the following:    Height as of this encounter: 72 cm (28.35\").    Weight as of this encounter: 90.7 kg (200 lb).    Vitals reviewed.   Constitutional:       Appearance: Healthy appearance. Not in distress.   Neck:      Vascular: No JVR. JVD normal.   Pulmonary:      Effort: Pulmonary effort is normal.      Breath sounds: Normal breath sounds. No wheezing. No rhonchi. No rales.   Chest:      Chest wall: Not tender to palpatation.   Cardiovascular:      PMI at left midclavicular line. Normal rate. Regular rhythm. Normal S1. Normal S2.       Murmurs: There is no murmur.      No gallop.  No click. No rub.   Pulses:     Intact distal pulses.   Edema:     Peripheral edema absent.   Abdominal:      General: Bowel sounds are normal.      Palpations: Abdomen is soft.      Tenderness: There is no abdominal tenderness.   Musculoskeletal: Normal range of motion.         General: No tenderness. Skin:     General: Skin is warm and dry.   Neurological:      General: No focal deficit present.      Mental Status: Alert and oriented to person, place and time.       Procedures       BUN   Date Value Ref Range Status "   02/23/2020 18 7 - 20 mg/dL Final     Creatinine   Date Value Ref Range Status   02/23/2020 0.8 0.7 - 1.5 mg/dL Final     Potassium   Date Value Ref Range Status   02/23/2020 4.3 3.5 - 5.1 mmol/L Final           ASSESSMENT:     No diagnosis found.        PLAN OF CARE:     Precordial pain -no evidence of coronary disease and calcium score of 0 on CT coronary angiogram.  No indication for further testing.  Symptoms have resolved.  Essential hypertension -seemingly well-controlled this time.  Twice daily blood pressure log to be sent in after 1 week.  Mixed hyperlipidemia    I will see the patient as needed.  Please call with any questions or concerns.             Discharge Medications            Accurate as of February 21, 2025 11:38 AM. If you have any questions, ask your nurse or doctor.                Continue These Medications        Instructions Start Date   lansoprazole 30 MG capsule  Commonly known as: PREVACID   lansoprazole 30 mg capsule,delayed release      losartan 50 MG tablet  Commonly known as: COZAAR   1 tablet, Daily      pravastatin 10 MG tablet  Commonly known as: PRAVACHOL   pravastatin 10 mg tablet      ZyrTEC Allergy 10 MG tablet  Generic drug: cetirizine   1 tablet, Daily               Thank you for allowing me to participate in the care of your patient,      Sincerely,   Soto Irene Jr, MD  [facility]  02/21/25  11:38 EST

## 2025-05-29 ENCOUNTER — TRANSCRIBE ORDERS (OUTPATIENT)
Age: 45
End: 2025-05-29
Payer: COMMERCIAL

## 2025-05-29 DIAGNOSIS — E78.5 HYPERLIPIDEMIA, UNSPECIFIED HYPERLIPIDEMIA TYPE: ICD-10-CM

## 2025-05-29 DIAGNOSIS — R07.89 CHEST PRESSURE: Primary | ICD-10-CM

## 2025-05-29 DIAGNOSIS — I10 ESSENTIAL HYPERTENSION: ICD-10-CM
